# Patient Record
Sex: FEMALE | Race: WHITE
[De-identification: names, ages, dates, MRNs, and addresses within clinical notes are randomized per-mention and may not be internally consistent; named-entity substitution may affect disease eponyms.]

---

## 2018-07-02 ENCOUNTER — HOSPITAL ENCOUNTER (OUTPATIENT)
Dept: HOSPITAL 47 - LABWHC1 | Age: 28
Discharge: HOME | End: 2018-07-02
Attending: PHYSICIAN ASSISTANT
Payer: COMMERCIAL

## 2018-07-02 DIAGNOSIS — N92.6: Primary | ICD-10-CM

## 2018-07-02 LAB
ALT SERPL-CCNC: 22 U/L (ref 9–52)
AST SERPL-CCNC: 19 U/L (ref 14–36)
BUN SERPL-SCNC: 10 MG/DL (ref 7–17)
GLUCOSE SERPL-MCNC: 87 MG/DL (ref 74–99)
HBA1C MFR BLD: 4.9 % (ref 4–6)
HCG SERPL-MCNC: <2.4 MIU/ML
T4 FREE SERPL-MCNC: 0.99 NG/DL (ref 0.78–2.19)

## 2018-07-02 PROCEDURE — 84443 ASSAY THYROID STIM HORMONE: CPT

## 2018-07-02 PROCEDURE — 86762 RUBELLA ANTIBODY: CPT

## 2018-07-02 PROCEDURE — 86901 BLOOD TYPING SEROLOGIC RH(D): CPT

## 2018-07-02 PROCEDURE — 84402 ASSAY OF FREE TESTOSTERONE: CPT

## 2018-07-02 PROCEDURE — 84702 CHORIONIC GONADOTROPIN TEST: CPT

## 2018-07-02 PROCEDURE — 83520 IMMUNOASSAY QUANT NOS NONAB: CPT

## 2018-07-02 PROCEDURE — 82306 VITAMIN D 25 HYDROXY: CPT

## 2018-07-02 PROCEDURE — 86900 BLOOD TYPING SEROLOGIC ABO: CPT

## 2018-07-02 PROCEDURE — 83525 ASSAY OF INSULIN: CPT

## 2018-07-02 PROCEDURE — 80074 ACUTE HEPATITIS PANEL: CPT

## 2018-07-02 PROCEDURE — 87390 HIV-1 AG IA: CPT

## 2018-07-02 PROCEDURE — 83001 ASSAY OF GONADOTROPIN (FSH): CPT

## 2018-07-02 PROCEDURE — 84460 ALANINE AMINO (ALT) (SGPT): CPT

## 2018-07-02 PROCEDURE — 84403 ASSAY OF TOTAL TESTOSTERONE: CPT

## 2018-07-02 PROCEDURE — 36415 COLL VENOUS BLD VENIPUNCTURE: CPT

## 2018-07-02 PROCEDURE — 84450 TRANSFERASE (AST) (SGOT): CPT

## 2018-07-02 PROCEDURE — 83036 HEMOGLOBIN GLYCOSYLATED A1C: CPT

## 2018-07-02 PROCEDURE — 86800 THYROGLOBULIN ANTIBODY: CPT

## 2018-07-02 PROCEDURE — 84146 ASSAY OF PROLACTIN: CPT

## 2018-07-02 PROCEDURE — 82565 ASSAY OF CREATININE: CPT

## 2018-07-02 PROCEDURE — 84439 ASSAY OF FREE THYROXINE: CPT

## 2018-07-02 PROCEDURE — 86787 VARICELLA-ZOSTER ANTIBODY: CPT

## 2018-07-02 PROCEDURE — 84481 FREE ASSAY (FT-3): CPT

## 2018-07-02 PROCEDURE — 86038 ANTINUCLEAR ANTIBODIES: CPT

## 2018-07-02 PROCEDURE — 86780 TREPONEMA PALLIDUM: CPT

## 2018-07-02 PROCEDURE — 84520 ASSAY OF UREA NITROGEN: CPT

## 2018-07-02 PROCEDURE — 82670 ASSAY OF TOTAL ESTRADIOL: CPT

## 2018-07-02 PROCEDURE — 82947 ASSAY GLUCOSE BLOOD QUANT: CPT

## 2018-07-02 PROCEDURE — 86376 MICROSOMAL ANTIBODY EACH: CPT

## 2018-07-02 PROCEDURE — 86850 RBC ANTIBODY SCREEN: CPT

## 2018-08-10 ENCOUNTER — HOSPITAL ENCOUNTER (OUTPATIENT)
Dept: HOSPITAL 47 - LABWHC1 | Age: 28
Discharge: HOME | End: 2018-08-10
Attending: OBSTETRICS & GYNECOLOGY
Payer: COMMERCIAL

## 2018-08-10 DIAGNOSIS — E56.9: Primary | ICD-10-CM

## 2018-08-10 PROCEDURE — 82306 VITAMIN D 25 HYDROXY: CPT

## 2018-08-10 PROCEDURE — 36415 COLL VENOUS BLD VENIPUNCTURE: CPT

## 2018-08-10 PROCEDURE — 86787 VARICELLA-ZOSTER ANTIBODY: CPT

## 2019-09-14 ENCOUNTER — HOSPITAL ENCOUNTER (EMERGENCY)
Dept: HOSPITAL 47 - EC | Age: 29
LOS: 1 days | Discharge: HOME | End: 2019-09-15
Payer: COMMERCIAL

## 2019-09-14 VITALS — TEMPERATURE: 98.2 F

## 2019-09-14 DIAGNOSIS — Z32.02: ICD-10-CM

## 2019-09-14 DIAGNOSIS — Z90.89: ICD-10-CM

## 2019-09-14 DIAGNOSIS — R10.2: Primary | ICD-10-CM

## 2019-09-14 PROCEDURE — 83690 ASSAY OF LIPASE: CPT

## 2019-09-14 PROCEDURE — 96375 TX/PRO/DX INJ NEW DRUG ADDON: CPT

## 2019-09-14 PROCEDURE — 96361 HYDRATE IV INFUSION ADD-ON: CPT

## 2019-09-14 PROCEDURE — 80053 COMPREHEN METABOLIC PANEL: CPT

## 2019-09-14 PROCEDURE — 85025 COMPLETE CBC W/AUTO DIFF WBC: CPT

## 2019-09-14 PROCEDURE — 81025 URINE PREGNANCY TEST: CPT

## 2019-09-14 PROCEDURE — 93975 VASCULAR STUDY: CPT

## 2019-09-14 PROCEDURE — 36415 COLL VENOUS BLD VENIPUNCTURE: CPT

## 2019-09-14 PROCEDURE — 82150 ASSAY OF AMYLASE: CPT

## 2019-09-14 PROCEDURE — 76830 TRANSVAGINAL US NON-OB: CPT

## 2019-09-14 PROCEDURE — 96374 THER/PROPH/DIAG INJ IV PUSH: CPT

## 2019-09-14 PROCEDURE — 96376 TX/PRO/DX INJ SAME DRUG ADON: CPT

## 2019-09-14 PROCEDURE — 99284 EMERGENCY DEPT VISIT MOD MDM: CPT

## 2019-09-14 PROCEDURE — 81003 URINALYSIS AUTO W/O SCOPE: CPT

## 2019-09-15 VITALS — RESPIRATION RATE: 18 BRPM | HEART RATE: 70 BPM | DIASTOLIC BLOOD PRESSURE: 81 MMHG | SYSTOLIC BLOOD PRESSURE: 110 MMHG

## 2019-09-15 LAB
ALBUMIN SERPL-MCNC: 4.4 G/DL (ref 3.5–5)
ALP SERPL-CCNC: 52 U/L (ref 38–126)
ALT SERPL-CCNC: 12 U/L (ref 9–52)
AMYLASE SERPL-CCNC: 66 U/L (ref 30–110)
ANION GAP SERPL CALC-SCNC: 11 MMOL/L
AST SERPL-CCNC: 20 U/L (ref 14–36)
BASOPHILS # BLD AUTO: 0 K/UL (ref 0–0.2)
BASOPHILS NFR BLD AUTO: 0 %
BUN SERPL-SCNC: 7 MG/DL (ref 7–17)
CALCIUM SPEC-MCNC: 9.5 MG/DL (ref 8.4–10.2)
CHLORIDE SERPL-SCNC: 106 MMOL/L (ref 98–107)
CO2 SERPL-SCNC: 23 MMOL/L (ref 22–30)
EOSINOPHIL # BLD AUTO: 0.1 K/UL (ref 0–0.7)
EOSINOPHIL NFR BLD AUTO: 1 %
ERYTHROCYTE [DISTWIDTH] IN BLOOD BY AUTOMATED COUNT: 3.99 M/UL (ref 3.8–5.4)
ERYTHROCYTE [DISTWIDTH] IN BLOOD: 13.5 % (ref 11.5–15.5)
GLUCOSE SERPL-MCNC: 97 MG/DL (ref 74–99)
HCT VFR BLD AUTO: 36.7 % (ref 34–46)
HGB BLD-MCNC: 12.4 GM/DL (ref 11.4–16)
LYMPHOCYTES # SPEC AUTO: 2.4 K/UL (ref 1–4.8)
LYMPHOCYTES NFR SPEC AUTO: 26 %
MCH RBC QN AUTO: 31.2 PG (ref 25–35)
MCHC RBC AUTO-ENTMCNC: 33.9 G/DL (ref 31–37)
MCV RBC AUTO: 92 FL (ref 80–100)
MONOCYTES # BLD AUTO: 0.4 K/UL (ref 0–1)
MONOCYTES NFR BLD AUTO: 4 %
NEUTROPHILS # BLD AUTO: 6 K/UL (ref 1.3–7.7)
NEUTROPHILS NFR BLD AUTO: 67 %
PH UR: 6 [PH] (ref 5–8)
PLATELET # BLD AUTO: 385 K/UL (ref 150–450)
POTASSIUM SERPL-SCNC: 4.1 MMOL/L (ref 3.5–5.1)
PROT SERPL-MCNC: 7.7 G/DL (ref 6.3–8.2)
SODIUM SERPL-SCNC: 140 MMOL/L (ref 137–145)
SP GR UR: 1.01 (ref 1–1.03)
UROBILINOGEN UR QL STRIP: <2 MG/DL (ref ?–2)
WBC # BLD AUTO: 9 K/UL (ref 3.8–10.6)

## 2019-09-15 NOTE — ED
Abdominal Pain HPI





- General


Chief Complaint: Abdominal Pain


Stated Complaint: Pelvic Pain


Time Seen by Provider: 09/15/19 00:34


Source: patient


Mode of arrival: ambulatory


Limitations: no limitations





- History of Present Illness


Initial Comments: 


29-year-old female patient with past medical history significant for 

endometriosis presents to the emergency department today for evaluation of 

pelvic pain.  Patient states that she deals with pelvic pain on a daily basis 

however over the last few days the pain has been increasing.  Patient states she

hasn't taking ibuprofen and Ultram however doesn't seem to be helping.  She 

denies any abnormal vaginal bleeding or discharge.  Patient states that she 

takes birth control and her  has a vasectomy so she denies chance of 

pregnancy.  She denies any fever or chills with this.  Denies any hematuria, 

dysuria, urinary urgency, urinary frequency.  Patient states her symptoms are 

consistent with her usual flares of endometriosis pain.  She does have an 

exploratory laparoscopy scheduled for 09/26/2019 with her gynecologist out of 

[place]. Patient denies any recent rash, shortness breath, chest pain, 

vomiting, diarrhea, constipation, back pain, numbness, tingling, dizziness, 

weakness, headache, visual changes, or any other complaints.








- Related Data


                                Home Medications











 Medication  Instructions  Recorded  Confirmed


 


Lo Ogesterone 1 tab PO DAILY 11/25/14 11/25/14











                                    Allergies











Allergy/AdvReac Type Severity Reaction Status Date / Time


 


No Known Allergies Allergy   Verified 09/14/19 23:56














Review of Systems


ROS Statement: 


Those systems with pertinent positive or pertinent negative responses have been 

documented in the HPI.





ROS Other: All systems not noted in ROS Statement are negative.





Past Medical History


Additional Past Medical History / Comment(s): endometriosis


History of Any Multi-Drug Resistant Organisms: None Reported


Past Surgical History: Cholecystectomy


Additional Past Surgical History / Comment(s): laparascopy, wisdom teeth, egd


Past Psychological History: No Psychological Hx Reported


Smoking Status: Never smoker


Past Alcohol Use History: Occasional


Past Drug Use History: None Reported





General Exam


Limitations: no limitations


General appearance: alert, in no apparent distress, other (This is a well-

developed, well-nourished adult female patient in no acute distress.  Vital 

signs upon presentation are temperature 98.2F, pulse 77, respirations 20, blood

pressure 142/91, pulse ox 98% on room air.)


Eye exam: Present: normal appearance, PERRL, EOMI.  Absent: scleral icterus, 

conjunctival injection, periorbital swelling


ENT exam: Present: normal exam, normal oropharynx, mucous membranes moist


Respiratory exam: Present: normal lung sounds bilaterally.  Absent: respiratory 

distress, wheezes, rales, rhonchi, stridor


Cardiovascular Exam: Present: regular rate, normal rhythm, normal heart sounds. 

Absent: systolic murmur, diastolic murmur, rubs, gallop, clicks


GI/Abdominal exam: Present: soft, tenderness (Supra pubic tenderness), normal 

bowel sounds.  Absent: distended, guarding, rebound, rigid


Neurological exam: Present: alert, oriented X3, CN II-XII intact


Psychiatric exam: Present: normal affect, normal mood


Skin exam: Present: warm, dry, intact, normal color.  Absent: rash





Course


                                   Vital Signs











  09/14/19 09/15/19





  23:53 03:09


 


Temperature 98.2 F 


 


Pulse Rate 77 70


 


Respiratory 20 18





Rate  


 


Blood Pressure 142/91 110/81


 


O2 Sat by Pulse 98 99





Oximetry  














Medical Decision Making





- Medical Decision Making


21-year-old she presents to the emergency department today for evaluation of 

pelvic pain.  She does have history of endometriosis and states symptoms are 

consistent with her endometriosis pain.  States that she also had right inguinal

hernia repaired in the past and this did feel similar as well.  Physical 

examination does reveal suprapubic abdominal tenderness.  She is afebrile normal

vital signs.  Labs reviewed and are unremarkable.  Pelvic ultrasound was 

obtained and shows no acute abnormalities.  Patient is given pain medication 

here in the department which did improve her symptoms.  She'll be discharged at 

this time to follow-up with her gynecologist for further evaluation as soon as 

possible.  Return parameters were discussed in detail.  She verbalizes 

understanding and agrees with this plan.








- Lab Data


Result diagrams: 


                                 09/15/19 00:51





                                 09/15/19 00:51


                                   Lab Results











  09/15/19 09/15/19 09/15/19 Range/Units





  00:51 00:51 00:51 


 


WBC   9.0   (3.8-10.6)  k/uL


 


RBC   3.99   (3.80-5.40)  m/uL


 


Hgb   12.4   (11.4-16.0)  gm/dL


 


Hct   36.7   (34.0-46.0)  %


 


MCV   92.0   (80.0-100.0)  fL


 


MCH   31.2   (25.0-35.0)  pg


 


MCHC   33.9   (31.0-37.0)  g/dL


 


RDW   13.5   (11.5-15.5)  %


 


Plt Count   385   (150-450)  k/uL


 


Neutrophils %   67   %


 


Lymphocytes %   26   %


 


Monocytes %   4   %


 


Eosinophils %   1   %


 


Basophils %   0   %


 


Neutrophils #   6.0   (1.3-7.7)  k/uL


 


Lymphocytes #   2.4   (1.0-4.8)  k/uL


 


Monocytes #   0.4   (0-1.0)  k/uL


 


Eosinophils #   0.1   (0-0.7)  k/uL


 


Basophils #   0.0   (0-0.2)  k/uL


 


Sodium  140    (137-145)  mmol/L


 


Potassium  4.1    (3.5-5.1)  mmol/L


 


Chloride  106    ()  mmol/L


 


Carbon Dioxide  23    (22-30)  mmol/L


 


Anion Gap  11    mmol/L


 


BUN  7    (7-17)  mg/dL


 


Creatinine  0.65    (0.52-1.04)  mg/dL


 


Est GFR (CKD-EPI)AfAm  >90    (>60 ml/min/1.73 sqM)  


 


Est GFR (CKD-EPI)NonAf  >90    (>60 ml/min/1.73 sqM)  


 


Glucose  97    (74-99)  mg/dL


 


Calcium  9.5    (8.4-10.2)  mg/dL


 


Total Bilirubin  0.2    (0.2-1.3)  mg/dL


 


AST  20    (14-36)  U/L


 


ALT  12    (9-52)  U/L


 


Alkaline Phosphatase  52    ()  U/L


 


Total Protein  7.7    (6.3-8.2)  g/dL


 


Albumin  4.4    (3.5-5.0)  g/dL


 


Amylase  66    ()  U/L


 


Lipase  59    ()  U/L


 


Urine Color     


 


Urine Appearance     (Clear)  


 


Urine pH     (5.0-8.0)  


 


Ur Specific Gravity     (1.001-1.035)  


 


Urine Protein     (Negative)  


 


Urine Glucose (UA)     (Negative)  


 


Urine Ketones     (Negative)  


 


Urine Blood     (Negative)  


 


Urine Nitrite     (Negative)  


 


Urine Bilirubin     (Negative)  


 


Urine Urobilinogen     (<2.0)  mg/dL


 


Ur Leukocyte Esterase     (Negative)  


 


Urine HCG, Qual    Not Detected  (Not Detectd)  














  09/15/19 Range/Units





  00:51 


 


WBC   (3.8-10.6)  k/uL


 


RBC   (3.80-5.40)  m/uL


 


Hgb   (11.4-16.0)  gm/dL


 


Hct   (34.0-46.0)  %


 


MCV   (80.0-100.0)  fL


 


MCH   (25.0-35.0)  pg


 


MCHC   (31.0-37.0)  g/dL


 


RDW   (11.5-15.5)  %


 


Plt Count   (150-450)  k/uL


 


Neutrophils %   %


 


Lymphocytes %   %


 


Monocytes %   %


 


Eosinophils %   %


 


Basophils %   %


 


Neutrophils #   (1.3-7.7)  k/uL


 


Lymphocytes #   (1.0-4.8)  k/uL


 


Monocytes #   (0-1.0)  k/uL


 


Eosinophils #   (0-0.7)  k/uL


 


Basophils #   (0-0.2)  k/uL


 


Sodium   (137-145)  mmol/L


 


Potassium   (3.5-5.1)  mmol/L


 


Chloride   ()  mmol/L


 


Carbon Dioxide   (22-30)  mmol/L


 


Anion Gap   mmol/L


 


BUN   (7-17)  mg/dL


 


Creatinine   (0.52-1.04)  mg/dL


 


Est GFR (CKD-EPI)AfAm   (>60 ml/min/1.73 sqM)  


 


Est GFR (CKD-EPI)NonAf   (>60 ml/min/1.73 sqM)  


 


Glucose   (74-99)  mg/dL


 


Calcium   (8.4-10.2)  mg/dL


 


Total Bilirubin   (0.2-1.3)  mg/dL


 


AST   (14-36)  U/L


 


ALT   (9-52)  U/L


 


Alkaline Phosphatase   ()  U/L


 


Total Protein   (6.3-8.2)  g/dL


 


Albumin   (3.5-5.0)  g/dL


 


Amylase   ()  U/L


 


Lipase   ()  U/L


 


Urine Color  Light Yellow  


 


Urine Appearance  Clear  (Clear)  


 


Urine pH  6.0  (5.0-8.0)  


 


Ur Specific Gravity  1.006  (1.001-1.035)  


 


Urine Protein  Negative  (Negative)  


 


Urine Glucose (UA)  Negative  (Negative)  


 


Urine Ketones  Negative  (Negative)  


 


Urine Blood  Negative  (Negative)  


 


Urine Nitrite  Negative  (Negative)  


 


Urine Bilirubin  Negative  (Negative)  


 


Urine Urobilinogen  <2.0  (<2.0)  mg/dL


 


Ur Leukocyte Esterase  Negative  (Negative)  


 


Urine HCG, Qual   (Not Detectd)  














- Radiology Data


Radiology results: report reviewed, image reviewed


Ultrasound of the pelvis was obtained.  Report was reviewed in its entirety.  

Impression by Dr. Montague shows no acute findings.





Disposition


Clinical Impression: 


 Pelvic pain





Disposition: HOME SELF-CARE


Condition: Good


Instructions (If sedation given, give patient instructions):  Pelvic Pain in 

Women (ED)


Additional Instructions: 


Follow-up with your primary care physician for recheck in 1-2 days.  Take all 

medications for pain.  Return to the emergency department immediately for any 

new, worsening, or concerning symptoms.


Is patient prescribed a controlled substance at d/c from ED?: No


Referrals: 


Blue Sharpe DO [Primary Care Provider] - 1-2 days


Time of Disposition: 02:53

## 2019-09-15 NOTE — US
EXAM:

  US Pelvis Transvaginal

 

CLINICAL HISTORY:

  ITS.REASON US Reason: Pelvic pain/Rule out torsion

 

TECHNIQUE:

  Real-time transvaginal pelvic ultrasound with image documentation.  

Transvaginal imaging was used for better evaluation of the endometrium 

and adnexa.

 

COMPARISON:

  No relevant prior studies available.

 

FINDINGS:

  Uterus/cervix: Centimeters.  Normal endometrial stripe thickness.  No 

myometrial mass.

  Right ovary: 1.6 cm.  No mass.  Normal blood flow.

  Left ovary: 2 cm.  No mass.  Normal blood flow.

  Free fluid:  No free fluid.

  Bladder:  Empty bladder which cannot be evaluated with this probe.

 

IMPRESSION:     

  No acute findings.

## 2019-10-04 ENCOUNTER — HOSPITAL ENCOUNTER (EMERGENCY)
Dept: HOSPITAL 47 - EC | Age: 29
LOS: 1 days | Discharge: HOME | End: 2019-10-05
Payer: COMMERCIAL

## 2019-10-04 VITALS — RESPIRATION RATE: 18 BRPM | TEMPERATURE: 97.8 F

## 2019-10-04 DIAGNOSIS — Z79.3: ICD-10-CM

## 2019-10-04 DIAGNOSIS — R10.2: Primary | ICD-10-CM

## 2019-10-04 DIAGNOSIS — Z87.42: ICD-10-CM

## 2019-10-04 DIAGNOSIS — Z90.49: ICD-10-CM

## 2019-10-04 LAB
ALBUMIN SERPL-MCNC: 4.4 G/DL (ref 3.5–5)
ALP SERPL-CCNC: 56 U/L (ref 38–126)
ALT SERPL-CCNC: 16 U/L (ref 9–52)
ANION GAP SERPL CALC-SCNC: 11 MMOL/L
AST SERPL-CCNC: 21 U/L (ref 14–36)
BASOPHILS # BLD AUTO: 0 K/UL (ref 0–0.2)
BASOPHILS NFR BLD AUTO: 1 %
BUN SERPL-SCNC: 11 MG/DL (ref 7–17)
CALCIUM SPEC-MCNC: 9.5 MG/DL (ref 8.4–10.2)
CHLORIDE SERPL-SCNC: 106 MMOL/L (ref 98–107)
CO2 SERPL-SCNC: 22 MMOL/L (ref 22–30)
EOSINOPHIL # BLD AUTO: 0.1 K/UL (ref 0–0.7)
EOSINOPHIL NFR BLD AUTO: 2 %
ERYTHROCYTE [DISTWIDTH] IN BLOOD BY AUTOMATED COUNT: 3.82 M/UL (ref 3.8–5.4)
ERYTHROCYTE [DISTWIDTH] IN BLOOD: 11.9 % (ref 11.5–15.5)
GLUCOSE SERPL-MCNC: 88 MG/DL (ref 74–99)
HCT VFR BLD AUTO: 35.9 % (ref 34–46)
HGB BLD-MCNC: 11.6 GM/DL (ref 11.4–16)
LYMPHOCYTES # SPEC AUTO: 1.8 K/UL (ref 1–4.8)
LYMPHOCYTES NFR SPEC AUTO: 30 %
MCH RBC QN AUTO: 30.3 PG (ref 25–35)
MCHC RBC AUTO-ENTMCNC: 32.2 G/DL (ref 31–37)
MCV RBC AUTO: 94 FL (ref 80–100)
MONOCYTES # BLD AUTO: 0.2 K/UL (ref 0–1)
MONOCYTES NFR BLD AUTO: 4 %
NEUTROPHILS # BLD AUTO: 3.8 K/UL (ref 1.3–7.7)
NEUTROPHILS NFR BLD AUTO: 62 %
PH UR: 7 [PH] (ref 5–8)
PLATELET # BLD AUTO: 409 K/UL (ref 150–450)
POTASSIUM SERPL-SCNC: 4.1 MMOL/L (ref 3.5–5.1)
PROT SERPL-MCNC: 7.6 G/DL (ref 6.3–8.2)
SODIUM SERPL-SCNC: 139 MMOL/L (ref 137–145)
SP GR UR: 1 (ref 1–1.03)
UROBILINOGEN UR QL STRIP: <2 MG/DL (ref ?–2)
WBC # BLD AUTO: 6.1 K/UL (ref 3.8–10.6)

## 2019-10-04 PROCEDURE — 83605 ASSAY OF LACTIC ACID: CPT

## 2019-10-04 PROCEDURE — 80053 COMPREHEN METABOLIC PANEL: CPT

## 2019-10-04 PROCEDURE — 96375 TX/PRO/DX INJ NEW DRUG ADDON: CPT

## 2019-10-04 PROCEDURE — 93976 VASCULAR STUDY: CPT

## 2019-10-04 PROCEDURE — 99284 EMERGENCY DEPT VISIT MOD MDM: CPT

## 2019-10-04 PROCEDURE — 76856 US EXAM PELVIC COMPLETE: CPT

## 2019-10-04 PROCEDURE — 96374 THER/PROPH/DIAG INJ IV PUSH: CPT

## 2019-10-04 PROCEDURE — 36415 COLL VENOUS BLD VENIPUNCTURE: CPT

## 2019-10-04 PROCEDURE — 81003 URINALYSIS AUTO W/O SCOPE: CPT

## 2019-10-04 PROCEDURE — 81025 URINE PREGNANCY TEST: CPT

## 2019-10-04 PROCEDURE — 85025 COMPLETE CBC W/AUTO DIFF WBC: CPT

## 2019-10-04 PROCEDURE — 76830 TRANSVAGINAL US NON-OB: CPT

## 2019-10-04 PROCEDURE — 74177 CT ABD & PELVIS W/CONTRAST: CPT

## 2019-10-04 NOTE — US
EXAMINATION TYPE: US transvaginal

 

DATE OF EXAM: 10/4/2019

 

COMPARISON: NONE

 

CLINICAL HISTORY: right sided pelvic pain, hx of endometriosis, laparoscopic 
surgery 1 week prior

 

TECHNIQUE:  Transvaginal (TV).  

 

Date of LMP:  patient takes birth control straight through to not have 
periods/April

 

EXAM MEASUREMENTS:

 

Uterus:  5.9 x 2.3 x 3.7 cm

Endometrial Stripe: 0.7 cm

Right Ovary:  2.0 x 1.2 x 1.4 cm

Left Ovary:  Obscured by overlying bowel gas

 

 

 

 

1. Uterus:  Anteverted   wnl

2. Endometrium:  heterogeneous, may be due to recent surgery

3. Right Ovary:  wnl

4. Left Ovary:  Obscured by overlying bowel gas

**Spectral, color and waveform doppler imaging shows good arterial and venous 
flow within the right ovary; there is no evidence for ovarian torsion on right, 
left not seen

5. Bilateral Adnexa:  wnl

6. Posterior cul-de-sac:  wnl

 

 

 

IMPRESSION: Negative transvaginal pelvic sonogram. No endometrial thickening. No
adnexal mass or free fluid.

 

  

 

MTDD

## 2019-10-04 NOTE — ED
Female Urogenital HPI





- General


Chief complaint: Urogenital


Stated complaint: pelvic pain


Time Seen by Provider: 10/04/19 21:47


Source: patient


Mode of arrival: wheelchair


Limitations: no limitations





- History of Present Illness


Initial comments: 


29-year-old female presents emergency department for chief complaint of right-

sided pelvic pain x 1 day.  Patient had recent surgical procedure performed 1 

week prior for endometriosis as a laparoscopic ablations.  Patient states that 

she was discharged with hydrocodone and Zofran.  Patient states that her pain 

has been persistent since the surgery however she felt it was within reasonable 

discomfort associated with the postsurgical status.  Patient states that today 

around 5 PM she noticed right lower pelvic discomfort.  She states the increase 

in by 6 PM she was doubled over in pain.  Patient states he has alleviated 

somewhat but is still sharp and localized to the right lower area of the pelvic 

region.  Patient denies any new vaginal bleeding vaginal discharge fevers chills

night sweats upper abdominal pain.  Patient denies any vomiting states she has 

had occasional nausea.  Patient denies diarrhea.  Patient does have history of 

previous cholecystectomy.  Remaining review system negative.  Upon arrival 

patient does not appear in acute distress.  She denies pregnancy.








- Related Data


                                Home Medications











 Medication  Instructions  Recorded  Confirmed


 


Lo Ogesterone 1 tab PO DAILY 11/25/14 11/25/14











                                    Allergies











Allergy/AdvReac Type Severity Reaction Status Date / Time


 


No Known Allergies Allergy   Verified 10/04/19 21:13














Review of Systems


ROS Statement: 


Those systems with pertinent positive or pertinent negative responses have been 

documented in the HPI.





ROS Other: All systems not noted in ROS Statement are negative.





Past Medical History


Additional Past Medical History / Comment(s): endometriosis


History of Any Multi-Drug Resistant Organisms: None Reported


Past Surgical History: Cholecystectomy


Additional Past Surgical History / Comment(s): laparascopy, wisdom teeth, egd


Past Psychological History: No Psychological Hx Reported


Smoking Status: Never smoker


Past Alcohol Use History: Occasional


Past Drug Use History: None Reported





General Exam





- General Exam Comments


Initial Comments: 


General:  The patient is awake and alert, in no distress, and does not appear 

acutely ill. 


Eye:  Pupils are equal, round and reactive to light, extra-ocular movements are 

intact.  No nystagmus.  There is normal conjunctiva bilaterally.  No signs of 

icterus.  


Cardiovascular:  There is a regular rate and rhythm. No murmur, rub or gallop is

appreciated.


Respiratory:  Lungs are clear to auscultation, respirations are non-labored, 

breath sounds are equal.  No wheezes, stridor, rales, or rhonchi.


Gastrointestinal:  Soft, non-distended, abdomen tender to the right lower aspect

of the pelvic region, the abdomen is nontender, no mcburneys point tenderness 

and the abdomen is without masses or organomegaly noted. There is no rebound or 

guarding present.  No CVA tenderness. Bowel sounds are unremarkable.


Musculoskeletal:  Normal ROM, no tenderness.  Strength 5/5. Sensation intact. 

Pulses equal bilaterally 2+.  


Neurological:  A&O x 3. CN II-XII intact grossly, There are no obvious motor or 

sensory deficits. Coordination appears grossly intact. Speech is normal.


Skin:  Skin is warm and dry and no rashes or lesions are noted. 


Psychiatric:  Cooperative, appropriate mood & affect, normal judgment.  





Limitations: no limitations





Course


                                   Vital Signs











  10/04/19 10/05/19





  21:09 00:18


 


Temperature 97.8 F 


 


Pulse Rate 85 99


 


Respiratory 18 18





Rate  


 


Blood Pressure 123/73 132/86


 


O2 Sat by Pulse 99 98





Oximetry  














Medical Decision Making





- Medical Decision Making


29-year-old female presents emergency department for chief complaint of right 

lower pelvic pain.  Patient states began suddenly between 5-6Pm. Patient states 

the pain is alleviated somewhat sharp in nature, and going.  Patient denies 

urinary symptoms.  Patient denies upper abdominal pain.  No McBurney's point 

tenderness.  No history of fever.  Patient did have appetite earlier in the day.

 Patient states she has had some post surgical pain has been consistent.  

Patient is follow-up next week with OB/GYN.  History of endometriosis.  

Ultrasound revealed no acute findings no torsion.  No large ovarian cyst.  CT 

revealed a trace amount of fluid in the posterior cul-de-sac.  Could be from 

recent procedure, or ruptured ovarian cyst.  At this time discussed findings the

patient including laboratory studies which are unremarkable.  She is agreeable 

discharge.  Requesting IV pain medications prior to discharge patient be 

discharged with a starter pack for Tylenol No. 3 for pain management.  Otherwise

at this time I do feel patient is stable for discharge with outpatient primary 

care and OB/GYN follow-up patient is agreeable to this care plan and was 

discharged appearing well. Discussed case in detail with attending Dr. Rojas











- Lab Data


Result diagrams: 


                                 10/04/19 22:30





                                 10/04/19 22:30


                                   Lab Results











  10/04/19 10/04/19 10/04/19 Range/Units





  22:05 22:05 22:30 


 


WBC    6.1  (3.8-10.6)  k/uL


 


RBC    3.82  (3.80-5.40)  m/uL


 


Hgb    11.6  (11.4-16.0)  gm/dL


 


Hct    35.9  (34.0-46.0)  %


 


MCV    94.0  (80.0-100.0)  fL


 


MCH    30.3  (25.0-35.0)  pg


 


MCHC    32.2  (31.0-37.0)  g/dL


 


RDW    11.9  (11.5-15.5)  %


 


Plt Count    409  (150-450)  k/uL


 


Neutrophils %    62  %


 


Lymphocytes %    30  %


 


Monocytes %    4  %


 


Eosinophils %    2  %


 


Basophils %    1  %


 


Neutrophils #    3.8  (1.3-7.7)  k/uL


 


Lymphocytes #    1.8  (1.0-4.8)  k/uL


 


Monocytes #    0.2  (0-1.0)  k/uL


 


Eosinophils #    0.1  (0-0.7)  k/uL


 


Basophils #    0.0  (0-0.2)  k/uL


 


Sodium     (137-145)  mmol/L


 


Potassium     (3.5-5.1)  mmol/L


 


Chloride     ()  mmol/L


 


Carbon Dioxide     (22-30)  mmol/L


 


Anion Gap     mmol/L


 


BUN     (7-17)  mg/dL


 


Creatinine     (0.52-1.04)  mg/dL


 


Est GFR (CKD-EPI)AfAm     (>60 ml/min/1.73 sqM)  


 


Est GFR (CKD-EPI)NonAf     (>60 ml/min/1.73 sqM)  


 


Glucose     (74-99)  mg/dL


 


Plasma Lactic Acid Tony     (0.7-2.0)  mmol/L


 


Calcium     (8.4-10.2)  mg/dL


 


Total Bilirubin     (0.2-1.3)  mg/dL


 


AST     (14-36)  U/L


 


ALT     (9-52)  U/L


 


Alkaline Phosphatase     ()  U/L


 


Total Protein     (6.3-8.2)  g/dL


 


Albumin     (3.5-5.0)  g/dL


 


Urine Color   Colorless   


 


Urine Appearance   Clear   (Clear)  


 


Urine pH   7.0   (5.0-8.0)  


 


Ur Specific Gravity   1.005   (1.001-1.035)  


 


Urine Protein   Negative   (Negative)  


 


Urine Glucose (UA)   Negative   (Negative)  


 


Urine Ketones   Negative   (Negative)  


 


Urine Blood   Negative   (Negative)  


 


Urine Nitrite   Negative   (Negative)  


 


Urine Bilirubin   Negative   (Negative)  


 


Urine Urobilinogen   <2.0   (<2.0)  mg/dL


 


Ur Leukocyte Esterase   Negative   (Negative)  


 


Urine HCG, Qual  Not Detected    (Not Detectd)  














  10/04/19 10/04/19 Range/Units





  22:30 22:30 


 


WBC    (3.8-10.6)  k/uL


 


RBC    (3.80-5.40)  m/uL


 


Hgb    (11.4-16.0)  gm/dL


 


Hct    (34.0-46.0)  %


 


MCV    (80.0-100.0)  fL


 


MCH    (25.0-35.0)  pg


 


MCHC    (31.0-37.0)  g/dL


 


RDW    (11.5-15.5)  %


 


Plt Count    (150-450)  k/uL


 


Neutrophils %    %


 


Lymphocytes %    %


 


Monocytes %    %


 


Eosinophils %    %


 


Basophils %    %


 


Neutrophils #    (1.3-7.7)  k/uL


 


Lymphocytes #    (1.0-4.8)  k/uL


 


Monocytes #    (0-1.0)  k/uL


 


Eosinophils #    (0-0.7)  k/uL


 


Basophils #    (0-0.2)  k/uL


 


Sodium  139   (137-145)  mmol/L


 


Potassium  4.1   (3.5-5.1)  mmol/L


 


Chloride  106   ()  mmol/L


 


Carbon Dioxide  22   (22-30)  mmol/L


 


Anion Gap  11   mmol/L


 


BUN  11   (7-17)  mg/dL


 


Creatinine  0.67   (0.52-1.04)  mg/dL


 


Est GFR (CKD-EPI)AfAm  >90   (>60 ml/min/1.73 sqM)  


 


Est GFR (CKD-EPI)NonAf  >90   (>60 ml/min/1.73 sqM)  


 


Glucose  88   (74-99)  mg/dL


 


Plasma Lactic Acid Tony   0.8  (0.7-2.0)  mmol/L


 


Calcium  9.5   (8.4-10.2)  mg/dL


 


Total Bilirubin  0.3   (0.2-1.3)  mg/dL


 


AST  21   (14-36)  U/L


 


ALT  16   (9-52)  U/L


 


Alkaline Phosphatase  56   ()  U/L


 


Total Protein  7.6   (6.3-8.2)  g/dL


 


Albumin  4.4   (3.5-5.0)  g/dL


 


Urine Color    


 


Urine Appearance    (Clear)  


 


Urine pH    (5.0-8.0)  


 


Ur Specific Gravity    (1.001-1.035)  


 


Urine Protein    (Negative)  


 


Urine Glucose (UA)    (Negative)  


 


Urine Ketones    (Negative)  


 


Urine Blood    (Negative)  


 


Urine Nitrite    (Negative)  


 


Urine Bilirubin    (Negative)  


 


Urine Urobilinogen    (<2.0)  mg/dL


 


Ur Leukocyte Esterase    (Negative)  


 


Urine HCG, Qual    (Not Detectd)  














Disposition


Clinical Impression: 


 Pelvic pain, Hx of endometriosis





Disposition: HOME SELF-CARE


Condition: Good


Instructions (If sedation given, give patient instructions):  Pelvic Pain in 

Women (ED), Ruptured Ovarian Cyst (ED)


Additional Instructions: 


Please use medication as discussed.  Please follow-up with OBGYN in next week.  

Please return to emergency room if the symptoms increase or worsen or for any 

other concerns.


Is patient prescribed a controlled substance at d/c from ED?: No


Referrals: 


Blue Sharpe DO [Primary Care Provider] - 1-2 days


Time of Disposition: 00:16

## 2019-10-05 VITALS — DIASTOLIC BLOOD PRESSURE: 86 MMHG | HEART RATE: 99 BPM | SYSTOLIC BLOOD PRESSURE: 132 MMHG

## 2019-10-05 NOTE — CT
EXAMINATION TYPE: CT abdomen pelvis w con

 

DATE OF EXAM: 10/4/2019

 

COMPARISON: 5/29/2010

 

HISTORY: Pain

 

CT DLP: mGycm

Automated exposure control for dose reduction was used.

 

TECHNIQUE:  Helical acquisition of images was performed from the lung bases through the pelvis.

 

CONTRAST: 

Performed and , patient injected with mL of .

Contrast was Isovue 100 mL.

FINDINGS: 

 

Lung bases are clear. There is no pleural effusion. Heart appears normal.

 

Liver spleen stomach pancreas appear normal. There are clips from cholecystectomy. Bile ducts are not
 dilated.

 

There is no adrenal mass. Kidneys show satisfactory contrast opacification. There is no hydronephrosi
s. Ureters are not dilated. There is normal renal function. Bladder distends smoothly. There is antev
erted uterus. There is tiny amount of free fluid in the cul-de-sac.

 

There is no mesenteric edema. There is no ascites or free air. There is some retained fecal material 
throughout the large bowel. Appendix is not definitely seen. There is no sign of thickened appendix. 
The lumbar vertebra have normal alignment. Disc spaces are fairly normal. Posterior elements are inta
ct. Bony pelvis is intact.

IMPRESSION: 

NEGATIVE CT SCAN ABDOMEN AND PELVIS. NO SIGN OF THICKENED APPENDIX. TINY AMOUNT OF FLUID IN THE CUL-D
E-SAC ON THE RIGHT SIDE.